# Patient Record
(demographics unavailable — no encounter records)

---

## 2024-10-21 NOTE — CONSULT LETTER
[Dear  ___] : Dear  [unfilled], [Consult Letter:] : I had the pleasure of evaluating your patient, [unfilled]. [Please see my note below.] : Please see my note below. [Consult Closing:] : Thank you very much for allowing me to participate in the care of this patient.  If you have any questions, please do not hesitate to contact me. [Sincerely,] : Sincerely, [FreeTextEntry3] : Dr. Nahum Narvaez

## 2024-10-21 NOTE — PHYSICAL EXAM
[de-identified] : General appearance: well-nourished and hydrated, pleasant, alert and oriented x 3, cooperative. HEENT: Normocephalic, EOM intact, Nasal septum midline, Oral cavity clear, External auditory canal clear. Cardiovascular: no apparent abnormalities, no lower leg edema, no varicosities, pedal pulses are palpable. Lymphatics Lymph nodes: none palpated, Lymphedema: not present. Neurologic: sensation is normal, no muscle weakness in upper or lower extremities, patella tendon reflexes intact. Dermatologic no apparent skin lesions, moist, warm, no rash. Spine: cervical spine appears normal and moves freely, thoracic spine appears normal and moves freely, lumbosacral spine appears normal and moves freely. Gait: nonantalgic.   Right Knee Inspection: trace effusion Wounds: healed midline incision Alignment: normal. Palpation: medial tenderness on palpation. ROM: Active (in degrees): 0-135 Ligamentous laxity (neg): AP translation > 10 mm in flexion, medial and lateral laxity > 5mm in flexion, stable in extension Patellofemoral Alignment Test: Q angle-, normal. Muscle Test: good quad strength. Leg examination: calf is soft and non-tender. [de-identified] : Right knee x-ray, AP, lateral, merchant view taken at the office today demonstrates a total knee replacement in satisfactory position and alignment. No evidence of loosening. The patella sits in a central position.  Left knee x-ray merchant view taken at the office today demonstrates good joint space and a well centered patella.

## 2024-10-21 NOTE — PHYSICAL EXAM
[de-identified] : General appearance: well-nourished and hydrated, pleasant, alert and oriented x 3, cooperative. HEENT: Normocephalic, EOM intact, Nasal septum midline, Oral cavity clear, External auditory canal clear. Cardiovascular: no apparent abnormalities, no lower leg edema, no varicosities, pedal pulses are palpable. Lymphatics Lymph nodes: none palpated, Lymphedema: not present. Neurologic: sensation is normal, no muscle weakness in upper or lower extremities, patella tendon reflexes intact. Dermatologic no apparent skin lesions, moist, warm, no rash. Spine: cervical spine appears normal and moves freely, thoracic spine appears normal and moves freely, lumbosacral spine appears normal and moves freely. Gait: nonantalgic.   Right Knee Inspection: trace effusion Wounds: healed midline incision Alignment: normal. Palpation: medial tenderness on palpation. ROM: Active (in degrees): 0-135 Ligamentous laxity (neg): AP translation > 10 mm in flexion, medial and lateral laxity > 5mm in flexion, stable in extension Patellofemoral Alignment Test: Q angle-, normal. Muscle Test: good quad strength. Leg examination: calf is soft and non-tender. [de-identified] : Right knee x-ray, AP, lateral, merchant view taken at the office today demonstrates a total knee replacement in satisfactory position and alignment. No evidence of loosening. The patella sits in a central position.  Left knee x-ray merchant view taken at the office today demonstrates good joint space and a well centered patella.

## 2025-04-02 NOTE — HISTORY OF PRESENT ILLNESS
[de-identified] : ERICKSON STONE 70 year old female presents for follow-up evaluation of right TKR instability and left OA. The left knee is more symptomatic and her right TKR was done with Dr. Lorenz in April 2021 with a DJO implant. She underwent a spinal l4-l5 fusion in November 2024 and had postop right sided pain and weakness which is recovering. She had done PT 1 month ago. Her right knee suffers from some subjective shifting, swelling, and pain. Her left knee is more symptomatic with pain present for several yrs. The pt takes Meloxicam, Tizanidine, and occasional Tylenol for pain control. She also uses OTC sleeve for the knees and started PT 1 month ago for the back. She had steroid and gels yrs ago and had 2 genicular nerve block most recently 3 weeks ago. The pt is not ready for surgery and wants to steroid if possible.

## 2025-04-02 NOTE — PROCEDURE
[de-identified] : LEFT KNEE CORTISONE INJECTION Discussed at length with the patient the planned steroid and lidocaine injection. The risks, benefits, convalescence and alternatives were reviewed. The possible side effects discussed included but were not limited to: pain, swelling, heat and redness. These symptoms are generally mild but if they are extensive then contact the office. Giving pain relievers by mouth such as NSAIDs or Tylenol can generally treat the reactions to steroid and lidocaine. Rare cases of infection have been noted. Rash, hives and itching may occur post injection. If you have muscle pain or cramps, flushing and or swelling of the face, rapid heart beat, nausea, dizziness, fever, chills, headache, difficulty breathing, swelling in the arms or legs, or have a prickly feeling of your skin, contact a health care provider immediately.   Following this discussion, the knee was prepped with betadine and under sterile conditions 5 cc of 2% lidocaine and 1 cc depo-medrol (40mg) were injected with a 21 gauge needle. The needle was introduced into the joint, aspiration was performed to ensure intra-articular placement and the medication was injected. Upon withdrawal of the needle the site was cleaned with alcohol and a bandaid applied. The patient tolerated the injection well and there were no adverse effects. Post injection instructions included no strenuous activity for 24 hours, cryotherapy and if there are any adverse effects to contact the office.

## 2025-04-02 NOTE — DISCUSSION/SUMMARY
[de-identified] : The patient has an unstable right TKR. She knows that she would eventually need a revision TKR, however at the present time she is recovering from her spinal fusion and is undergoing PT. She needs to get stronger before surgery is consider.  Discussed at length the nature of the patients condition. Their left knee symptoms appear secondary to degenerative arthritis. Due to the compensatory gait, she has an increase in pain in the left knee. For the acute pain patient was given a left knee cortisone injection today as detailed above for symptomatic relief. I have given her a prescription for PT. I have also suggested that she continue with her Meloxicam and Tylenol. They can continue activities as tolerated.  I will see her back in 3-4 months with x-ray of both knees.

## 2025-04-02 NOTE — CONSULT LETTER
[Dear  ___] : Dear  [unfilled], [Consult Letter:] : I had the pleasure of evaluating your patient, [unfilled]. [Please see my note below.] : Please see my note below. [Consult Closing:] : Thank you very much for allowing me to participate in the care of this patient.  If you have any questions, please do not hesitate to contact me. [Sincerely,] : Sincerely, [FreeTextEntry3] : Dr. Nahum Narvaez Treatment 1: Taltz 80 mg injected every 4 weeks

## 2025-04-02 NOTE — PHYSICAL EXAM
[de-identified] : General appearance: well-nourished and hydrated, pleasant, alert and oriented x 3, cooperative. HEENT: Normocephalic, EOM intact, Nasal septum midline, Oral cavity clear, External auditory canal clear. Cardiovascular: no apparent abnormalities, no lower leg edema, no varicosities, pedal pulses are palpable. Lymphatics Lymph nodes: none palpated, Lymphedema: not present. Neurologic: sensation is normal, no muscle weakness in upper or lower extremities, patella tendon reflexes intact. Dermatologic no apparent skin lesions, moist, warm, no rash. Spine: cervical spine appears normal and moves freely, thoracic spine appears normal and moves freely, lumbosacral spine appears normal and moves freely. Gait: nonantalgic.   Right Knee Inspection: trace effusion Wounds: healed midline incision Alignment: normal. Palpation: medial tenderness on palpation. ROM: Active (in degrees): 0-135 Ligamentous laxity (neg): AP translation > 10 mm in flexion, medial and lateral laxity > 5mm in flexion, stable in extension. Patellofemoral Alignment Test: Q angle-, normal. Muscle Test: good quad strength. Leg examination: calf is soft and non-tender.  Left Knee  Inspection: no effusion Wounds: none. Alignment: normal. Palpation: quadriceps tendon, superior pole of patella, and tibial tubercle tenderness on palpation. ROM: Active (in degrees): 0-135 with discomfort through arc of motion especially at extremes Ligamentous laxity (neg): all ligaments appear stable, negative ant. drawer test, negative post. drawer test, stable to varus stress test, stable to valgus stress test, negative Lachman's test, negative pivot shift test, Meniscal Test: negative McMurrays, negative Nirmal. Patellofemoral Alignment Test: Q angle-, normal. Muscle Test: good quad strength. Leg examination: calf is soft and non-tender. [de-identified] : Right knee x-ray, AP, lateral, merchant view taken at the office today demonstrates a total knee replacement in satisfactory position and alignment. No evidence of loosening. The patella sits in a central position.  Left knee x-ray merchant view taken at the office today demonstrates joint space narrowing and a well centered patella.

## 2025-04-02 NOTE — ADDENDUM
[FreeTextEntry1] : This note was written by Cheng Bray on 04/02/2025 acting as scribe for Dr. Nahum Narvaez M.D.   I, Dr. Nahum Narvaez, have read and attest that all the information, medical decision making and discharge instructions within are true and accurate.